# Patient Record
Sex: FEMALE | Race: WHITE
[De-identification: names, ages, dates, MRNs, and addresses within clinical notes are randomized per-mention and may not be internally consistent; named-entity substitution may affect disease eponyms.]

---

## 2019-01-12 ENCOUNTER — HOSPITAL ENCOUNTER (EMERGENCY)
Dept: HOSPITAL 91 - FTE | Age: 69
Discharge: HOME | End: 2019-01-12
Payer: SELF-PAY

## 2019-01-12 ENCOUNTER — HOSPITAL ENCOUNTER (EMERGENCY)
Dept: HOSPITAL 10 - FTE | Age: 69
Discharge: HOME | End: 2019-01-12
Payer: SELF-PAY

## 2019-01-12 VITALS — HEART RATE: 79 BPM | SYSTOLIC BLOOD PRESSURE: 130 MMHG | RESPIRATION RATE: 18 BRPM | DIASTOLIC BLOOD PRESSURE: 81 MMHG

## 2019-01-12 VITALS — BODY MASS INDEX: 34.18 KG/M2 | HEIGHT: 55 IN | WEIGHT: 147.71 LBS
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
## 2019-01-12 DIAGNOSIS — S91.349A: Primary | ICD-10-CM

## 2019-01-12 DIAGNOSIS — Z23: ICD-10-CM

## 2019-01-12 PROCEDURE — 90471 IMMUNIZATION ADMIN: CPT

## 2019-01-12 PROCEDURE — 99283 EMERGENCY DEPT VISIT LOW MDM: CPT

## 2019-01-12 PROCEDURE — 73630 X-RAY EXAM OF FOOT: CPT

## 2019-01-12 PROCEDURE — 10120 INC&RMVL FB SUBQ TISS SMPL: CPT

## 2019-01-12 PROCEDURE — 90715 TDAP VACCINE 7 YRS/> IM: CPT

## 2019-01-12 RX ADMIN — ACETAMINOPHEN 1 MG: 325 TABLET, FILM COATED ORAL at 17:17

## 2019-01-12 RX ADMIN — LIDOCAINE HYDROCHLORIDE 1 ML: 10 INJECTION, SOLUTION INFILTRATION; PERINEURAL at 17:17

## 2019-01-12 RX ADMIN — CLOSTRIDIUM TETANI TOXOID ANTIGEN (FORMALDEHYDE INACTIVATED), CORYNEBACTERIUM DIPHTHERIAE TOXOID ANTIGEN (FORMALDEHYDE INACTIVATED), BORDETELLA PERTUSSIS TOXOID ANTIGEN (GLUTARALDEHYDE INACTIVATED), BORDETELLA PERTUSSIS FILAMENTOUS HEMAGGLUTININ ANTIGEN (FORMALDEHYDE INACTIVATED), BORDETELLA PERTUSSIS PERTACTIN ANTIGEN, AND BORDETELLA PERTUSSIS FIMBRIAE 2/3 ANTIGEN 1 ML: 5; 2; 2.5; 5; 3; 5 INJECTION, SUSPENSION INTRAMUSCULAR at 17:18

## 2019-01-12 NOTE — ERD
ER Documentation


Chief Complaint


Chief Complaint





LEFT FOOT POSSIBLE FOREIGH BODY(GLASS)





HPI


68-year-old female presents with a puncture wound left foot for the last 10 


days.  She feels there is possibly a piece of glass.  She stepped on some glass 


after child broke a glass and did not clean out.  Tetanus is not up-to-date.  


She denies any redness or fevers.





ROS


All systems reviewed and are negative except as per history of present illness.





Medications


Home Meds


Active Scripts


Cephalexin* (Keflex*) 500 Mg Capsule, 500 MG PO QID for 5 Days, CAP


   Prov:SULLY ESPINAL MD         1/12/19


Ibuprofen* (Motrin*) 600 Mg Tab, 600 MG PO Q6, #15 TAB


   Prov:SULLY ESPINAL MD         1/12/19





PMhx/Soc


Medical and Surgical Hx:  pt denies Medical Hx, pt denies Surgical Hx


Hx Alcohol Use:  No


Hx Substance Use:  No


Hx Tobacco Use:  No


Smoking Status:  Never smoker





FmHx


Family History:  No diabetes, No coronary disease, No other





Physical Exam


Vitals





Vital Signs


  Date      Temp  Pulse  Resp  B/P (MAP)   Pulse Ox  O2          O2 Flow    FiO2


Time                                                 Delivery    Rate


   1/12/19  98.2     76    18      136/61        99


     15:33                           (86)





Physical Exam


Const:   No acute distress


Head:   Atraumatic 


Eyes:    Normal Conjunctiva


ENT:    Normal External Ears, Nose and Mouth.


Neck:               Full range of motion. No meningismus.


Resp:   Clear to auscultation bilaterally


Cardio:   Regular rate and rhythm, no murmurs


Abd:    Soft, non tender, non distended. Normal bowel sounds


Skin:   No petechiae or rashes


Back:   No midline or flank tenderness


Ext:    No cyanosis, or edema.  Small sealed puncture wound with tenderness on 


the plantar surface metatarsal area of the left foot.  No erythema, discharge or


fluctuance.


Neur:   Awake and alert


Psych:    Normal Mood and Affect


Results 24 hrs





Current Medications


 Medications
   Dose
          Sig/Hannah
       Start Time
   Status  Last


 (Trade)       Ordered        Route
 PRN     Stop Time              Admin
Dose


                              Reason                                Admin


                650 mg         ONCE  ONCE
    1/12/19       DC           1/12/19


Acetaminophen                 PO
            17:30
                       17:17




  (Tylenol                                  1/12/19 17:31


Tab)


 Lidocaine
     20 ml          ONCE  ONCE
    1/12/19       DC           1/12/19


(Xylocaine                    SC
            17:30
                       17:17



1%
  (Mdv) 20                                1/12/19 17:31


ml)


 Diphtheria/
   0.5 ml         ONCE ONCE
     1/12/19       DC           1/12/19


Tetanus/Acell                 IM*
           17:30
                       17:18




 Pertussis
                                 1/12/19 17:31


(Adacel)








Procedures/MDM


X-ray left foot 3V Interpreted by me: 


Bones:    No fracture


Joints:       No dislocation


Foreign body: 5 millimeter foreign body deep in the left second metatarsal head 


area.





Patient given a tetanus booster and Tylenol for pain.  Left foot was prepped 


with Betadine.  3 cc lidocaine was used for local filtration.  After incision 


and probing was unable to locate the foreign body which appears to be deep on 


the x-ray.  2 sutures were placed after unsuccessful attempt of removal of 


foreign body.





Patient will be discharged home with recommendations for 2-day wound check 7 


days suture removal and orthopedic evaluation for removal of foreign body under 


fluoroscopy or definitively.  She should return for fevers, redness, new 


worsening symptoms.





Departure


Diagnosis:  


   Primary Impression:  


   Foreign body


Condition:  Stable


Patient Instructions:  Foreign Body, Soft Tissue [Not Removed]


Referrals:  


JACKI BUCK MD





Additional Instructions:  


There appears to be remaining foreign body deep foot which may require 


specialist.  See orthopedist for further evaluation treatment for removal.  


Recheck in 2 days for infection.  May need authorization from primary doctor for


orthopedist visit.





Cheque 2 lopez para cheque para infeccion. cheque 7 lopez para saca los puntos / 


grapas.





Va al martin doctor/ specialista para mas evaluacon en el proximo semana. 


posiblemente necesita autorizado de martin doctor primario para specialista. Regresa


para fiebre, o mas o nueva simptomas.











SULLY ESPINAL MD             Jan 12, 2019 18:22

## 2019-01-14 ENCOUNTER — HOSPITAL ENCOUNTER (EMERGENCY)
Dept: HOSPITAL 91 - FTE | Age: 69
Discharge: HOME | End: 2019-01-14
Payer: SELF-PAY

## 2019-01-14 ENCOUNTER — HOSPITAL ENCOUNTER (EMERGENCY)
Dept: HOSPITAL 10 - FTE | Age: 69
Discharge: HOME | End: 2019-01-14
Payer: SELF-PAY

## 2019-01-14 VITALS — HEIGHT: 55 IN | WEIGHT: 139.55 LBS | BODY MASS INDEX: 32.3 KG/M2

## 2019-01-14 VITALS — RESPIRATION RATE: 16 BRPM | HEART RATE: 84 BPM | SYSTOLIC BLOOD PRESSURE: 130 MMHG | DIASTOLIC BLOOD PRESSURE: 60 MMHG

## 2019-01-14 DIAGNOSIS — Z48.01: Primary | ICD-10-CM

## 2019-01-14 PROCEDURE — 73630 X-RAY EXAM OF FOOT: CPT

## 2019-01-14 PROCEDURE — 99283 EMERGENCY DEPT VISIT LOW MDM: CPT
